# Patient Record
Sex: MALE | Race: WHITE | NOT HISPANIC OR LATINO | URBAN - METROPOLITAN AREA
[De-identification: names, ages, dates, MRNs, and addresses within clinical notes are randomized per-mention and may not be internally consistent; named-entity substitution may affect disease eponyms.]

---

## 2021-06-15 ENCOUNTER — TELEPHONE (OUTPATIENT)
Dept: FAMILY MEDICINE CLINIC | Facility: CLINIC | Age: 46
End: 2021-06-15

## 2021-06-15 NOTE — TELEPHONE ENCOUNTER
PATIENT IS CALLING IN HE WAS A PATIENT OF DOCTOR CHEL HAS NOT BEEN SEEN SINCE 2016. HE IS TRYING TO COME IN AND TALK TO DOCTOR ABOUT SOME ISSUES HE HAS BEEN HAVING SINCE BEGINNING OF THE YEAR.    TRIED TO MAKE HIM APPT AND DOCTOR NOT TAKING ANY NEW PATIENTS HE ASKED TO SEND NOTE BACK TO SEE IF DOCTOR WILL TAKE HIM BACK ON.    PLEASE ADVISE    CALLBACK NUMBER IS  [phone]

## 2021-07-06 NOTE — PROGRESS NOTES
"Yvon Marcos is a 45 y.o. male.     CC: Reestablishment of Care for HTN/Lipids    History of Present Illness     Pt returns today after a 5 year absence to reestablish care and to discuss some medical issues, although was in the [facility] ED end 2020 for left-sided stroke-like sx. W/U was negative and pt reports left leg back to normal and left arm improving, although not yet back.       The following portions of the patient's history were reviewed and updated as appropriate: allergies, current medications, past family history, past medical history, past social history, past surgical history and problem list.    Review of Systems   Constitutional: Negative for activity change, chills and fever.   Respiratory: Negative for cough.    Cardiovascular: Negative for chest pain.   Psychiatric/Behavioral: Negative for dysphoric mood.       /90   Pulse 67   Temp 97.3 °F (36.3 °C)   Resp 16   Ht 162.6 cm (64\")   Wt 103 kg (226 lb)   SpO2 96%   BMI 38.79 kg/m²     Objective   Physical Exam  Constitutional:       General: He is not in acute distress.     Appearance: He is well-developed.   Cardiovascular:      Rate and Rhythm: Normal rate and regular rhythm.   Pulmonary:      Effort: Pulmonary effort is normal.      Breath sounds: Normal breath sounds.   Neurological:      Mental Status: He is alert and oriented to person, place, and time.   Psychiatric:         Behavior: Behavior normal.         Thought Content: Thought content normal.     Hospital records reviewed with pt confirming HPI.      Assessment/Plan   Diagnoses and all orders for this visit:    1. Essential hypertension (Primary)  -     Comprehensive metabolic panel  -     Lipid panel  -     CBC and Differential  -     TSH    2. Pure hypercholesterolemia  -     Lipid panel    3. Annual physical exam  Comments:  for labs only, don't bill  Orders:  -     Comprehensive metabolic panel  -     Lipid panel  -     CBC and Differential  -     TSH  -    "  PSA    Diet/exercise/weight management to treat the glucose discussed.

## 2021-07-07 ENCOUNTER — OFFICE VISIT (OUTPATIENT)
Dept: FAMILY MEDICINE CLINIC | Facility: CLINIC | Age: 46
End: 2021-07-07

## 2021-07-07 VITALS
OXYGEN SATURATION: 96 % | BODY MASS INDEX: 38.58 KG/M2 | TEMPERATURE: 97.3 F | RESPIRATION RATE: 16 BRPM | WEIGHT: 226 LBS | DIASTOLIC BLOOD PRESSURE: 90 MMHG | HEART RATE: 67 BPM | SYSTOLIC BLOOD PRESSURE: 146 MMHG | HEIGHT: 64 IN

## 2021-07-07 DIAGNOSIS — I10 ESSENTIAL HYPERTENSION: Primary | Chronic | ICD-10-CM

## 2021-07-07 DIAGNOSIS — E78.00 PURE HYPERCHOLESTEROLEMIA: Chronic | ICD-10-CM

## 2021-07-07 DIAGNOSIS — Z00.00 ANNUAL PHYSICAL EXAM: ICD-10-CM

## 2021-07-07 DIAGNOSIS — R73.01 IFG (IMPAIRED FASTING GLUCOSE): Chronic | ICD-10-CM

## 2021-07-07 PROCEDURE — 99203 OFFICE O/P NEW LOW 30 MIN: CPT | Performed by: FAMILY MEDICINE

## 2021-07-07 RX ORDER — AMLODIPINE BESYLATE 10 MG/1
10 TABLET ORAL DAILY
Qty: 90 TABLET | Refills: 1 | Status: SHIPPED | OUTPATIENT
Start: 2021-07-07

## 2021-07-07 RX ORDER — ATORVASTATIN CALCIUM 20 MG/1
20 TABLET, FILM COATED ORAL DAILY
COMMUNITY
End: 2021-07-07 | Stop reason: SINTOL

## 2021-07-07 RX ORDER — ROSUVASTATIN CALCIUM 10 MG/1
10 TABLET, COATED ORAL DAILY
Qty: 90 TABLET | Refills: 1 | Status: SHIPPED | OUTPATIENT
Start: 2021-07-07

## 2021-07-07 RX ORDER — AMLODIPINE BESYLATE 10 MG/1
10 TABLET ORAL DAILY
COMMUNITY
End: 2021-07-07 | Stop reason: SDUPTHER

## 2021-07-08 LAB
ALBUMIN SERPL-MCNC: 5.3 G/DL (ref 3.5–5.2)
ALBUMIN/GLOB SERPL: 1.8 G/DL
ALP SERPL-CCNC: 121 U/L (ref 39–117)
ALT SERPL-CCNC: 68 U/L (ref 1–41)
AST SERPL-CCNC: 31 U/L (ref 1–40)
BASOPHILS # BLD AUTO: 0.05 10*3/MM3 (ref 0–0.2)
BASOPHILS NFR BLD AUTO: 0.6 % (ref 0–1.5)
BILIRUB SERPL-MCNC: 0.6 MG/DL (ref 0–1.2)
BUN SERPL-MCNC: 9 MG/DL (ref 6–20)
BUN/CREAT SERPL: 9.7 (ref 7–25)
CALCIUM SERPL-MCNC: 10.1 MG/DL (ref 8.6–10.5)
CHLORIDE SERPL-SCNC: 97 MMOL/L (ref 98–107)
CHOLEST SERPL-MCNC: 189 MG/DL (ref 0–200)
CO2 SERPL-SCNC: 29.2 MMOL/L (ref 22–29)
CREAT SERPL-MCNC: 0.93 MG/DL (ref 0.76–1.27)
EOSINOPHIL # BLD AUTO: 0.17 10*3/MM3 (ref 0–0.4)
EOSINOPHIL NFR BLD AUTO: 2.2 % (ref 0.3–6.2)
ERYTHROCYTE [DISTWIDTH] IN BLOOD BY AUTOMATED COUNT: 12.9 % (ref 12.3–15.4)
GLOBULIN SER CALC-MCNC: 2.9 GM/DL
GLUCOSE SERPL-MCNC: 109 MG/DL (ref 65–99)
HBA1C MFR BLD: 6.6 % (ref 4.8–5.6)
HCT VFR BLD AUTO: 47.8 % (ref 37.5–51)
HDLC SERPL-MCNC: 48 MG/DL (ref 40–60)
HGB BLD-MCNC: 16.4 G/DL (ref 13–17.7)
IMM GRANULOCYTES # BLD AUTO: 0.03 10*3/MM3 (ref 0–0.05)
IMM GRANULOCYTES NFR BLD AUTO: 0.4 % (ref 0–0.5)
LDLC SERPL CALC-MCNC: 73 MG/DL (ref 0–100)
LYMPHOCYTES # BLD AUTO: 1.72 10*3/MM3 (ref 0.7–3.1)
LYMPHOCYTES NFR BLD AUTO: 22 % (ref 19.6–45.3)
MCH RBC QN AUTO: 29.3 PG (ref 26.6–33)
MCHC RBC AUTO-ENTMCNC: 34.3 G/DL (ref 31.5–35.7)
MCV RBC AUTO: 85.5 FL (ref 79–97)
MONOCYTES # BLD AUTO: 0.5 10*3/MM3 (ref 0.1–0.9)
MONOCYTES NFR BLD AUTO: 6.4 % (ref 5–12)
NEUTROPHILS # BLD AUTO: 5.36 10*3/MM3 (ref 1.7–7)
NEUTROPHILS NFR BLD AUTO: 68.4 % (ref 42.7–76)
NRBC BLD AUTO-RTO: 0 /100 WBC (ref 0–0.2)
PLATELET # BLD AUTO: 276 10*3/MM3 (ref 140–450)
POTASSIUM SERPL-SCNC: 4.6 MMOL/L (ref 3.5–5.2)
PROT SERPL-MCNC: 8.2 G/DL (ref 6–8.5)
PSA SERPL-MCNC: 0.51 NG/ML (ref 0–4)
RBC # BLD AUTO: 5.59 10*6/MM3 (ref 4.14–5.8)
SODIUM SERPL-SCNC: 136 MMOL/L (ref 136–145)
TRIGL SERPL-MCNC: 435 MG/DL (ref 0–150)
TSH SERPL DL<=0.005 MIU/L-ACNC: 2.37 UIU/ML (ref 0.27–4.2)
VLDLC SERPL CALC-MCNC: 68 MG/DL (ref 5–40)
WBC # BLD AUTO: 7.83 10*3/MM3 (ref 3.4–10.8)

## 2021-07-10 NOTE — PROGRESS NOTES
Please call the patient regarding his abnormal result. Pt is a New Diabetic. Appt to coordinate care.

## 2023-09-18 NOTE — PROGRESS NOTES
Subjective   Venkat Marcos is a 47 y.o. male.     CC: Hospital F/U for Scrotal Swelling    History of Present Illness     Patient returns here greater than 2-year absence from last visit after being in the [place] emergency department on 9/6/2023 with this note:    History of Present Illness:   Patient is a 47-year-old male that presents emergency department with sudden onset testicular swelling. Patient history was obtained to patient review and chart review. Patient states that he awoke today with his testicles swollen. Patient states that it progressively worsened throughout the day. Patient denies any trauma manipulation or possibility of infection. Patient denied any significant discomfort other than a sensation of fullness. In the emergency department patient did have an ultrasound which showed some scrotal wall thickening but no acute changes. Emergency department physician did speak with urology who was amenable to seeing this patient in consultation in a.m., patient was started on IV antibiotics due to possibility of an infectious origin to the swelling.      IMPRESSION: 1.No evidence of testicular torsion. 2.Scrotal wall thickening is nonspecific and could be from systemic factors such as volume overload state.     Diagnosis:  Problem List:  Principal Problem:  Scrotal swelling (9/7/2023) (POA: Yes)    Plan:  Plan of care and additional treatment options were discussed with emergency department physician/provider prior to admission  1. Scrotal swelling: Urology consult in a.m., elevation of scrotum to prevent dependent swelling, IV ceftriaxone and metronidazole  2. VTE prophylaxis: SCDs    Brief plan:  Patient appears to be having cellulitis involving his groin along with tenia infection. I suspect patient had yeast infection because of excessive sweating and perspiration with superimposed bacterial infection causing his cellulitis. Overnight he received IV antibiotic with ceftriaxone and responded well. I  discussed with urology and there is no evidence of abscess or testicular involvement. No plans for any surgical intervention. We will start patient on Diflucan along with Keflex and topical miconazole powder. His swelling has gone down 50% just but overnight treatment which is much faster than anticipated. At this time he feels good and wishes to go home. He has to go to [facility] today. He feels much better and is anxious to go home today. I discussed with the patient and urology. Advised the patient that if for some reason he gets worse to call urology and follow-up in the office.    Principal Problem:  Scrotal swelling (9/7/2023) (POA: Yes)  Active Problems:  Tinea cruris (9/7/2023) (POA: Yes)  Cellulitis of groin, right (9/7/2023) (POA: Yes)    Current Discharge Medication List    START taking these medications  Details  cephalexin (KEFLEX) 500 MG capsule Take 1 capsule by mouth 3 (three) times daily for 7 days.  Qty: 21 capsule, Refills: 0    fluconazole (DIFLUCAN) 200 MG tablet Take 1 tablet by mouth daily for 7 days.  Qty: 7 tablet, Refills: 0    miconazole (MICOTIN) 2 % powder Apply topically 2 (two) times daily to affected area(s).  Qty: 71 g, Refills: 0    Current outpatient and discharge medications have been reconciled for the patient.  Reviewed by: Wiley Abdullahi MD    Today, pt reports feeling much better, with decreased size of the scrotum and decreased redness. Pt reports the hospital thinks this could have been caused by a spider bite.    Ancillary, his prior Norvasc had some SEs, and he stopped it.  The following portions of the patient's history were reviewed and updated as appropriate: allergies, current medications, past family history, past medical history, past social history, past surgical history, and problem list.    Review of Systems   Constitutional:  Negative for activity change, chills and fever.   Respiratory:  Negative for cough.    Cardiovascular:  Negative for chest pain.  "  Psychiatric/Behavioral:  Negative for dysphoric mood.      /84   Pulse 74   Temp 97.9 °F (36.6 °C) (Oral)   Resp 16   Ht 165.1 cm (65\")   Wt 101 kg (222 lb)   SpO2 96%   BMI 36.94 kg/m²     Objective   Physical Exam  Constitutional:       General: He is not in acute distress.     Appearance: He is well-developed.   Pulmonary:      Effort: Pulmonary effort is normal.   Neurological:      Mental Status: He is alert and oriented to person, place, and time.   Psychiatric:         Behavior: Behavior normal.         Thought Content: Thought content normal.   Hospital records reviewed with pt confirming HPI.      Assessment & Plan   Diagnoses and all orders for this visit:    1. Scrotal swelling (Primary)    2. Cellulitis of groin, right    3. Tinea cruris    4. Hospital discharge follow-up    5. Primary hypertension  Comments:  uncontrolled; medication begun  Orders:  -     losartan (Cozaar) 50 MG tablet; Take 1 tablet by mouth Daily.  Dispense: 90 tablet; Refill: 1                 "

## 2023-09-19 ENCOUNTER — OFFICE VISIT (OUTPATIENT)
Dept: FAMILY MEDICINE CLINIC | Facility: CLINIC | Age: 48
End: 2023-09-19
Payer: COMMERCIAL

## 2023-09-19 VITALS
TEMPERATURE: 97.9 F | HEIGHT: 65 IN | DIASTOLIC BLOOD PRESSURE: 84 MMHG | SYSTOLIC BLOOD PRESSURE: 149 MMHG | BODY MASS INDEX: 36.99 KG/M2 | HEART RATE: 74 BPM | RESPIRATION RATE: 16 BRPM | OXYGEN SATURATION: 96 % | WEIGHT: 222 LBS

## 2023-09-19 DIAGNOSIS — N50.89 SCROTAL SWELLING: Primary | ICD-10-CM

## 2023-09-19 DIAGNOSIS — I10 PRIMARY HYPERTENSION: ICD-10-CM

## 2023-09-19 DIAGNOSIS — B35.6 TINEA CRURIS: ICD-10-CM

## 2023-09-19 DIAGNOSIS — L03.314 CELLULITIS OF GROIN, RIGHT: ICD-10-CM

## 2023-09-19 DIAGNOSIS — Z09 HOSPITAL DISCHARGE FOLLOW-UP: ICD-10-CM

## 2023-09-19 RX ORDER — LOSARTAN POTASSIUM 50 MG/1
50 TABLET ORAL DAILY
Qty: 90 TABLET | Refills: 1 | Status: SHIPPED | OUTPATIENT
Start: 2023-09-19

## 2023-12-18 NOTE — PROGRESS NOTES
"  Chief Complaint:   Chief Complaint   Patient presents with    Hypertension       Venkat Marcos 48 y.o. male who presents today for Medical Management of the below listed issues. He  has a problem list of   Patient Active Problem List   Diagnosis    Hypertension    Pure hypercholesterolemia    IFG (impaired fasting glucose)   .  Since the last visit, He has overall felt well.  he has been compliant with   Current Outpatient Medications:     losartan (Cozaar) 50 MG tablet, Take 1 tablet by mouth Daily., Disp: 90 tablet, Rfl: 3    miconazole (MICOTIN) 2 % powder, Apply  topically to the appropriate area as directed., Disp: , Rfl: .  He denies medication side effects.    All of the other chronic condition(s) listed above are stable w/o issues.    /68   Pulse 88   Temp 97.7 °F (36.5 °C) (Oral)   Resp 16   Ht 165.1 cm (65\")   Wt 107 kg (236 lb)   SpO2 98%   BMI 39.27 kg/m²     Results for orders placed or performed in visit on 07/07/21   Comprehensive metabolic panel    Specimen: Blood   Result Value Ref Range    Glucose 109 (H) 65 - 99 mg/dL    BUN 9 6 - 20 mg/dL    Creatinine 0.93 0.76 - 1.27 mg/dL    eGFR Non African Am 87 >60 mL/min/1.73    eGFR African Am 106 >60 mL/min/1.73    BUN/Creatinine Ratio 9.7 7.0 - 25.0    Sodium 136 136 - 145 mmol/L    Potassium 4.6 3.5 - 5.2 mmol/L    Chloride 97 (L) 98 - 107 mmol/L    Total CO2 29.2 (H) 22.0 - 29.0 mmol/L    Calcium 10.1 8.6 - 10.5 mg/dL    Total Protein 8.2 6.0 - 8.5 g/dL    Albumin 5.30 (H) 3.50 - 5.20 g/dL    Globulin 2.9 gm/dL    A/G Ratio 1.8 g/dL    Total Bilirubin 0.6 0.0 - 1.2 mg/dL    Alkaline Phosphatase 121 (H) 39 - 117 U/L    AST (SGOT) 31 1 - 40 U/L    ALT (SGPT) 68 (H) 1 - 41 U/L   Lipid panel    Specimen: Blood   Result Value Ref Range    Total Cholesterol 189 0 - 200 mg/dL    Triglycerides 435 (H) 0 - 150 mg/dL    HDL Cholesterol 48 40 - 60 mg/dL    VLDL Cholesterol Raheem 68 (H) 5 - 40 mg/dL    LDL Chol Calc (NIH) 73 0 - 100 mg/dL   TSH    " Specimen: Blood   Result Value Ref Range    TSH 2.370 0.270 - 4.200 uIU/mL   PSA    Specimen: Blood   Result Value Ref Range    PSA 0.511 0.000 - 4.000 ng/mL   Hemoglobin A1c    Specimen: Blood   Result Value Ref Range    Hemoglobin A1C 6.60 (H) 4.80 - 5.60 %   CBC and Differential    Specimen: Blood   Result Value Ref Range    WBC 7.83 3.40 - 10.80 10*3/mm3    RBC 5.59 4.14 - 5.80 10*6/mm3    Hemoglobin 16.4 13.0 - 17.7 g/dL    Hematocrit 47.8 37.5 - 51.0 %    MCV 85.5 79.0 - 97.0 fL    MCH 29.3 26.6 - 33.0 pg    MCHC 34.3 31.5 - 35.7 g/dL    RDW 12.9 12.3 - 15.4 %    Platelets 276 140 - 450 10*3/mm3    Neutrophil Rel % 68.4 42.7 - 76.0 %    Lymphocyte Rel % 22.0 19.6 - 45.3 %    Monocyte Rel % 6.4 5.0 - 12.0 %    Eosinophil Rel % 2.2 0.3 - 6.2 %    Basophil Rel % 0.6 0.0 - 1.5 %    Neutrophils Absolute 5.36 1.70 - 7.00 10*3/mm3    Lymphocytes Absolute 1.72 0.70 - 3.10 10*3/mm3    Monocytes Absolute 0.50 0.10 - 0.90 10*3/mm3    Eosinophils Absolute 0.17 0.00 - 0.40 10*3/mm3    Basophils Absolute 0.05 0.00 - 0.20 10*3/mm3    Immature Granulocyte Rel % 0.4 0.0 - 0.5 %    Immature Grans Absolute 0.03 0.00 - 0.05 10*3/mm3    nRBC 0.0 0.0 - 0.2 /100 WBC             The following portions of the patient's history were reviewed and updated as appropriate: allergies, current medications, past family history, past medical history, past social history, past surgical history, and problem list.    Review of Systems   Constitutional:  Negative for activity change, chills and fever.   Respiratory:  Negative for cough.    Cardiovascular:  Negative for chest pain.   Psychiatric/Behavioral:  Negative for dysphoric mood.        Objective     Class 2 Severe Obesity (BMI >=35 and <=39.9). Obesity-related health conditions include the following: hypertension. Obesity is unchanged. BMI is is above average; BMI management plan is completed. We discussed portion control and increasing exercise.       Physical Exam  Constitutional:        General: He is not in acute distress.     Appearance: He is well-developed.   Cardiovascular:      Rate and Rhythm: Normal rate and regular rhythm.   Pulmonary:      Effort: Pulmonary effort is normal.      Breath sounds: Normal breath sounds.   Neurological:      Mental Status: He is alert and oriented to person, place, and time.   Psychiatric:         Behavior: Behavior normal.         Thought Content: Thought content normal.             Diagnoses and all orders for this visit:    1. Primary hypertension (Primary)  -     losartan (Cozaar) 50 MG tablet; Take 1 tablet by mouth Daily.  Dispense: 90 tablet; Refill: 3    2. Special screening for malignant neoplasms, colon  -     Cologuard - Stool, Per Rectum; Future    3. Screening for malignant neoplasm of the rectum  -     Cologuard - Stool, Per Rectum; Future    Patient doing very well and is encouraged to increase his cardio and continue current medications.

## 2023-12-19 ENCOUNTER — OFFICE VISIT (OUTPATIENT)
Dept: FAMILY MEDICINE CLINIC | Facility: CLINIC | Age: 48
End: 2023-12-19
Payer: COMMERCIAL

## 2023-12-19 VITALS
BODY MASS INDEX: 39.32 KG/M2 | HEART RATE: 88 BPM | TEMPERATURE: 97.7 F | OXYGEN SATURATION: 98 % | WEIGHT: 236 LBS | HEIGHT: 65 IN | SYSTOLIC BLOOD PRESSURE: 132 MMHG | DIASTOLIC BLOOD PRESSURE: 68 MMHG | RESPIRATION RATE: 16 BRPM

## 2023-12-19 DIAGNOSIS — Z12.11 SPECIAL SCREENING FOR MALIGNANT NEOPLASMS, COLON: ICD-10-CM

## 2023-12-19 DIAGNOSIS — I10 PRIMARY HYPERTENSION: Primary | ICD-10-CM

## 2023-12-19 DIAGNOSIS — Z12.12 SCREENING FOR MALIGNANT NEOPLASM OF THE RECTUM: ICD-10-CM

## 2023-12-19 RX ORDER — LOSARTAN POTASSIUM 50 MG/1
50 TABLET ORAL DAILY
Qty: 90 TABLET | Refills: 3 | Status: SHIPPED | OUTPATIENT
Start: 2023-12-19

## 2024-04-16 DIAGNOSIS — I10 PRIMARY HYPERTENSION: ICD-10-CM

## 2024-04-16 RX ORDER — LOSARTAN POTASSIUM 50 MG/1
50 TABLET ORAL DAILY
Qty: 90 TABLET | Refills: 3 | OUTPATIENT
Start: 2024-04-16

## 2024-12-16 NOTE — PROGRESS NOTES
"  Chief Complaint:   Chief Complaint   Patient presents with    Hypertension       Venkat Marcos 49 y.o. male who presents today for Medical Management of the below listed issues. He  has a problem list of   Patient Active Problem List   Diagnosis    Hypertension    Pure hypercholesterolemia    IFG (impaired fasting glucose)   .  Since the last visit, He has overall felt well.  he has been compliant with   Current Outpatient Medications:     losartan (COZAAR) 100 MG tablet, Take 1 tablet by mouth Daily., Disp: 90 tablet, Rfl: 1    miconazole (MICOTIN) 2 % powder, Apply  topically to the appropriate area as directed., Disp: , Rfl: .  He denies medication side effects.    All of the other chronic condition(s) listed above are stable w/o issues.    /80 Comment: takes bp at night  Pulse 98   Temp 98.3 °F (36.8 °C) (Oral)   Resp 18   Ht 165.1 cm (65\")   Wt 108 kg (237 lb 12.8 oz)   SpO2 100%   BMI 39.57 kg/m²     Results for orders placed or performed in visit on 07/07/21   Comprehensive metabolic panel    Collection Time: 07/07/21 10:54 AM    Specimen: Blood   Result Value Ref Range    Glucose 109 (H) 65 - 99 mg/dL    BUN 9 6 - 20 mg/dL    Creatinine 0.93 0.76 - 1.27 mg/dL    eGFR Non African Am 87 >60 mL/min/1.73    eGFR African Am 106 >60 mL/min/1.73    BUN/Creatinine Ratio 9.7 7.0 - 25.0    Sodium 136 136 - 145 mmol/L    Potassium 4.6 3.5 - 5.2 mmol/L    Chloride 97 (L) 98 - 107 mmol/L    Total CO2 29.2 (H) 22.0 - 29.0 mmol/L    Calcium 10.1 8.6 - 10.5 mg/dL    Total Protein 8.2 6.0 - 8.5 g/dL    Albumin 5.30 (H) 3.50 - 5.20 g/dL    Globulin 2.9 gm/dL    A/G Ratio 1.8 g/dL    Total Bilirubin 0.6 0.0 - 1.2 mg/dL    Alkaline Phosphatase 121 (H) 39 - 117 U/L    AST (SGOT) 31 1 - 40 U/L    ALT (SGPT) 68 (H) 1 - 41 U/L   Lipid panel    Collection Time: 07/07/21 10:54 AM    Specimen: Blood   Result Value Ref Range    Total Cholesterol 189 0 - 200 mg/dL    Triglycerides 435 (H) 0 - 150 mg/dL    HDL Cholesterol " 48 40 - 60 mg/dL    VLDL Cholesterol Arheem 68 (H) 5 - 40 mg/dL    LDL Chol Calc (NIH) 73 0 - 100 mg/dL   TSH    Collection Time: 07/07/21 10:54 AM    Specimen: Blood   Result Value Ref Range    TSH 2.370 0.270 - 4.200 uIU/mL   PSA    Collection Time: 07/07/21 10:54 AM    Specimen: Blood   Result Value Ref Range    PSA 0.511 0.000 - 4.000 ng/mL   Hemoglobin A1c    Collection Time: 07/07/21 10:54 AM    Specimen: Blood   Result Value Ref Range    Hemoglobin A1C 6.60 (H) 4.80 - 5.60 %   CBC and Differential    Collection Time: 07/07/21 10:54 AM    Specimen: Blood   Result Value Ref Range    WBC 7.83 3.40 - 10.80 10*3/mm3    RBC 5.59 4.14 - 5.80 10*6/mm3    Hemoglobin 16.4 13.0 - 17.7 g/dL    Hematocrit 47.8 37.5 - 51.0 %    MCV 85.5 79.0 - 97.0 fL    MCH 29.3 26.6 - 33.0 pg    MCHC 34.3 31.5 - 35.7 g/dL    RDW 12.9 12.3 - 15.4 %    Platelets 276 140 - 450 10*3/mm3    Neutrophil Rel % 68.4 42.7 - 76.0 %    Lymphocyte Rel % 22.0 19.6 - 45.3 %    Monocyte Rel % 6.4 5.0 - 12.0 %    Eosinophil Rel % 2.2 0.3 - 6.2 %    Basophil Rel % 0.6 0.0 - 1.5 %    Neutrophils Absolute 5.36 1.70 - 7.00 10*3/mm3    Lymphocytes Absolute 1.72 0.70 - 3.10 10*3/mm3    Monocytes Absolute 0.50 0.10 - 0.90 10*3/mm3    Eosinophils Absolute 0.17 0.00 - 0.40 10*3/mm3    Basophils Absolute 0.05 0.00 - 0.20 10*3/mm3    Immature Granulocyte Rel % 0.4 0.0 - 0.5 %    Immature Grans Absolute 0.03 0.00 - 0.05 10*3/mm3    nRBC 0.0 0.0 - 0.2 /100 WBC             The following portions of the patient's history were reviewed and updated as appropriate: allergies, current medications, past family history, past medical history, past social history, past surgical history, and problem list.    Review of Systems   Constitutional:  Negative for activity change, chills and fever.   Respiratory:  Negative for cough.    Cardiovascular:  Negative for chest pain.   Psychiatric/Behavioral:  Negative for dysphoric mood.        Objective             Physical Exam  Vitals and  nursing note reviewed.   Constitutional:       General: He is not in acute distress.     Appearance: He is well-developed.   Cardiovascular:      Rate and Rhythm: Normal rate and regular rhythm.   Pulmonary:      Effort: Pulmonary effort is normal.      Breath sounds: Normal breath sounds.   Neurological:      Mental Status: He is alert and oriented to person, place, and time.   Psychiatric:         Behavior: Behavior normal.         Thought Content: Thought content normal.     Pt never completed his Cologuard last year AMA.        Diagnoses and all orders for this visit:    1. Primary hypertension (Primary)  Comments:  Not to goal; medication adjusted  Orders:  -     Comprehensive metabolic panel  -     Lipid panel  -     CBC and Differential  -     TSH  -     losartan (COZAAR) 100 MG tablet; Take 1 tablet by mouth Daily.  Dispense: 90 tablet; Refill: 1    2. IFG (impaired fasting glucose)  -     Hemoglobin A1c    3. Screening for prostate cancer  -     PSA    4. Special screening for malignant neoplasms, colon  -     Cologuard - Stool, Per Rectum; Future    5. Screening for malignant neoplasm of the rectum  -     Cologuard - Stool, Per Rectum; Future

## 2024-12-17 ENCOUNTER — OFFICE VISIT (OUTPATIENT)
Dept: FAMILY MEDICINE CLINIC | Facility: CLINIC | Age: 49
End: 2024-12-17
Payer: COMMERCIAL

## 2024-12-17 VITALS
SYSTOLIC BLOOD PRESSURE: 152 MMHG | OXYGEN SATURATION: 100 % | TEMPERATURE: 98.3 F | WEIGHT: 237.8 LBS | DIASTOLIC BLOOD PRESSURE: 80 MMHG | HEIGHT: 65 IN | RESPIRATION RATE: 18 BRPM | BODY MASS INDEX: 39.62 KG/M2 | HEART RATE: 98 BPM

## 2024-12-17 DIAGNOSIS — I10 PRIMARY HYPERTENSION: Primary | ICD-10-CM

## 2024-12-17 DIAGNOSIS — Z12.12 SCREENING FOR MALIGNANT NEOPLASM OF THE RECTUM: ICD-10-CM

## 2024-12-17 DIAGNOSIS — Z12.11 SPECIAL SCREENING FOR MALIGNANT NEOPLASMS, COLON: ICD-10-CM

## 2024-12-17 DIAGNOSIS — R73.01 IFG (IMPAIRED FASTING GLUCOSE): ICD-10-CM

## 2024-12-17 DIAGNOSIS — Z12.5 SCREENING FOR PROSTATE CANCER: ICD-10-CM

## 2024-12-17 PROCEDURE — 99214 OFFICE O/P EST MOD 30 MIN: CPT | Performed by: FAMILY MEDICINE

## 2024-12-17 RX ORDER — LOSARTAN POTASSIUM 50 MG/1
50 TABLET ORAL DAILY
Qty: 90 TABLET | Refills: 1 | Status: CANCELLED | OUTPATIENT
Start: 2024-12-17

## 2024-12-17 RX ORDER — LOSARTAN POTASSIUM 100 MG/1
100 TABLET ORAL DAILY
Qty: 90 TABLET | Refills: 1 | Status: SHIPPED | OUTPATIENT
Start: 2024-12-17

## 2024-12-19 LAB
ALBUMIN SERPL-MCNC: 4.5 G/DL (ref 3.5–5.2)
ALBUMIN/GLOB SERPL: 1.5 G/DL
ALP SERPL-CCNC: 90 U/L (ref 39–117)
ALT SERPL-CCNC: 45 U/L (ref 1–41)
AST SERPL-CCNC: 23 U/L (ref 1–40)
BASOPHILS # BLD AUTO: 0.04 10*3/MM3 (ref 0–0.2)
BASOPHILS NFR BLD AUTO: 0.5 % (ref 0–1.5)
BILIRUB SERPL-MCNC: 0.5 MG/DL (ref 0–1.2)
BUN SERPL-MCNC: 11 MG/DL (ref 6–20)
BUN/CREAT SERPL: 12.8 (ref 7–25)
CALCIUM SERPL-MCNC: 9.5 MG/DL (ref 8.6–10.5)
CHLORIDE SERPL-SCNC: 95 MMOL/L (ref 98–107)
CHOLEST SERPL-MCNC: 321 MG/DL (ref 0–200)
CO2 SERPL-SCNC: 28.2 MMOL/L (ref 22–29)
CREAT SERPL-MCNC: 0.86 MG/DL (ref 0.76–1.27)
EGFRCR SERPLBLD CKD-EPI 2021: 104.8 ML/MIN/1.73
EOSINOPHIL # BLD AUTO: 0.15 10*3/MM3 (ref 0–0.4)
EOSINOPHIL NFR BLD AUTO: 2 % (ref 0.3–6.2)
ERYTHROCYTE [DISTWIDTH] IN BLOOD BY AUTOMATED COUNT: 12.5 % (ref 12.3–15.4)
GLOBULIN SER CALC-MCNC: 3 GM/DL
GLUCOSE SERPL-MCNC: 151 MG/DL (ref 65–99)
HBA1C MFR BLD: 8.1 % (ref 4.8–5.6)
HCT VFR BLD AUTO: 43.9 % (ref 37.5–51)
HDLC SERPL-MCNC: 32 MG/DL (ref 40–60)
HGB BLD-MCNC: 15.1 G/DL (ref 13–17.7)
IMM GRANULOCYTES # BLD AUTO: 0.05 10*3/MM3 (ref 0–0.05)
IMM GRANULOCYTES NFR BLD AUTO: 0.7 % (ref 0–0.5)
LDLC SERPL CALC-MCNC: ABNORMAL MG/DL
LYMPHOCYTES # BLD AUTO: 1.9 10*3/MM3 (ref 0.7–3.1)
LYMPHOCYTES NFR BLD AUTO: 25.7 % (ref 19.6–45.3)
MCH RBC QN AUTO: 29.7 PG (ref 26.6–33)
MCHC RBC AUTO-ENTMCNC: 34.4 G/DL (ref 31.5–35.7)
MCV RBC AUTO: 86.2 FL (ref 79–97)
MONOCYTES # BLD AUTO: 0.6 10*3/MM3 (ref 0.1–0.9)
MONOCYTES NFR BLD AUTO: 8.1 % (ref 5–12)
NEUTROPHILS # BLD AUTO: 4.64 10*3/MM3 (ref 1.7–7)
NEUTROPHILS NFR BLD AUTO: 63 % (ref 42.7–76)
NRBC BLD AUTO-RTO: 0 /100 WBC (ref 0–0.2)
PLATELET # BLD AUTO: 252 10*3/MM3 (ref 140–450)
POTASSIUM SERPL-SCNC: 4.8 MMOL/L (ref 3.5–5.2)
PROT SERPL-MCNC: 7.5 G/DL (ref 6–8.5)
PSA SERPL-MCNC: 0.62 NG/ML (ref 0–4)
RBC # BLD AUTO: 5.09 10*6/MM3 (ref 4.14–5.8)
SODIUM SERPL-SCNC: 137 MMOL/L (ref 136–145)
TRIGL SERPL-MCNC: 1090 MG/DL (ref 0–150)
TSH SERPL DL<=0.005 MIU/L-ACNC: 2.62 UIU/ML (ref 0.27–4.2)
VLDLC SERPL CALC-MCNC: ABNORMAL MG/DL
WBC # BLD AUTO: 7.38 10*3/MM3 (ref 3.4–10.8)